# Patient Record
Sex: MALE | Race: WHITE | NOT HISPANIC OR LATINO | URBAN - METROPOLITAN AREA
[De-identification: names, ages, dates, MRNs, and addresses within clinical notes are randomized per-mention and may not be internally consistent; named-entity substitution may affect disease eponyms.]

---

## 2017-01-25 NOTE — PATIENT DISCUSSION
Posterior Vitreous Detachment (PVD) Counseling: I have discussed the diagnosis of PVD with the patient and the possibility of a retinal tear or detachment. The signs/symptoms of a retinal tear/detachment were reviewed to include but not limited to redness, discharge, pain, increase or change in flashes of light, increase or change in floaters, decreased vision, part of the vision missing, veils or any other ocular concerns. I have further explained not all patients who develop a tear or detachment have notable symptoms, therefore, compliance with return visits are necessary. The patient was instructed to contact us immediately if they noticed any of the signs or symptoms of retinal detachment as noted above as the prognosis for any potential treatment options may be time related. Return for follow-up as scheduled.

## 2017-10-31 ENCOUNTER — IMPORTED ENCOUNTER (OUTPATIENT)
Dept: URBAN - METROPOLITAN AREA CLINIC 1 | Facility: CLINIC | Age: 62
End: 2017-10-31

## 2017-10-31 PROBLEM — H52.13: Noted: 2017-10-31

## 2017-10-31 PROCEDURE — S0621 ROUTINE OPHTHALMOLOGICAL EXA: HCPCS

## 2017-10-31 NOTE — PATIENT DISCUSSION
1. Myopia OU- Rx for glasses given. 2. Cats OU- observe Return for an appointment in 1 year 36 with Dr. Kameron Howard.

## 2018-11-01 ENCOUNTER — IMPORTED ENCOUNTER (OUTPATIENT)
Dept: URBAN - METROPOLITAN AREA CLINIC 1 | Facility: CLINIC | Age: 63
End: 2018-11-01

## 2018-11-01 PROBLEM — H52.13: Noted: 2018-11-01

## 2018-11-01 PROCEDURE — S0621 ROUTINE OPHTHALMOLOGICAL EXA: HCPCS

## 2018-11-01 NOTE — PATIENT DISCUSSION
1. Myopia OU- Rx for glasses given. 2. Cats OU- observeReturn for an appointment in 1 yr 36 with Dr. Kameron Howard.

## 2019-11-08 ENCOUNTER — IMPORTED ENCOUNTER (OUTPATIENT)
Dept: URBAN - METROPOLITAN AREA CLINIC 1 | Facility: CLINIC | Age: 64
End: 2019-11-08

## 2019-11-08 PROBLEM — H52.13: Noted: 2019-11-08

## 2019-11-08 PROBLEM — H52.4: Noted: 2019-11-08

## 2019-11-08 PROCEDURE — S0621 ROUTINE OPHTHALMOLOGICAL EXA: HCPCS

## 2019-11-08 NOTE — PATIENT DISCUSSION
1. Myopia: Rx was given for correction if indicated and requested. 2. Presbyopia3. Cataract OU - Observe Return for an appointment in 1 year 36 with Dr. Candace Monk.

## 2020-11-13 ENCOUNTER — IMPORTED ENCOUNTER (OUTPATIENT)
Dept: URBAN - METROPOLITAN AREA CLINIC 1 | Facility: CLINIC | Age: 65
End: 2020-11-13

## 2020-11-13 ENCOUNTER — PREPPED CHART (OUTPATIENT)
Dept: URBAN - METROPOLITAN AREA CLINIC 1 | Facility: CLINIC | Age: 65
End: 2020-11-13

## 2020-11-13 PROBLEM — H52.13: Noted: 2020-11-13

## 2020-11-13 PROBLEM — H52.4: Noted: 2020-11-13

## 2020-11-13 PROCEDURE — S0621 ROUTINE OPHTHALMOLOGICAL EXA: HCPCS

## 2020-11-13 NOTE — PATIENT DISCUSSION
1. Myopia: Rx was given for correction if indicated and requested. 2. Presbyopia3. Cataract OU - ObserveReturn for an appointment in 1 year 36 with Dr. Cathleen Medina.

## 2022-02-25 ENCOUNTER — COMPREHENSIVE EXAM (OUTPATIENT)
Dept: URBAN - METROPOLITAN AREA CLINIC 1 | Facility: CLINIC | Age: 67
End: 2022-02-25

## 2022-02-25 DIAGNOSIS — H52.223: ICD-10-CM

## 2022-02-25 DIAGNOSIS — H52.4: ICD-10-CM

## 2022-02-25 DIAGNOSIS — H52.13: ICD-10-CM

## 2022-02-25 PROCEDURE — S0621 ROUTINE OPHTHALMOLOGICAL EXA: HCPCS

## 2022-02-25 ASSESSMENT — VISUAL ACUITY
OS_CC: 20/40
OD_CC: 20/20
OD_CC: 20/25
OS_CC: 20/30

## 2022-02-25 ASSESSMENT — TONOMETRY
OD_IOP_MMHG: 20
OS_IOP_MMHG: 20
OS_IOP_MMHG: 20
OD_IOP_MMHG: 18

## 2022-04-02 ASSESSMENT — VISUAL ACUITY
OD_CC: J1+
OS_CC: J1+
OS_SC: 20/40
OD_SC: 20/20
OS_SC: 20/20
OS_SC: 20/20
OS_SC: J1+
OS_SC: J1
OD_SC: J1
OD_SC: 20/20
OD_SC: 20/20
OS_SC: 20/20
OD_SC: J1+
OD_SC: 20/20

## 2022-04-02 ASSESSMENT — TONOMETRY
OD_IOP_MMHG: 18
OD_IOP_MMHG: 20
OD_IOP_MMHG: 17
OS_IOP_MMHG: 16
OS_IOP_MMHG: 20
OS_IOP_MMHG: 20
OD_IOP_MMHG: 20
OS_IOP_MMHG: 20
OD_IOP_MMHG: 16

## 2022-09-14 ENCOUNTER — COMPREHENSIVE EXAM (OUTPATIENT)
Dept: URBAN - METROPOLITAN AREA CLINIC 1 | Facility: CLINIC | Age: 67
End: 2022-09-14

## 2022-09-14 DIAGNOSIS — H25.813: ICD-10-CM

## 2022-09-14 DIAGNOSIS — H35.033: ICD-10-CM

## 2022-09-14 PROCEDURE — 99214 OFFICE O/P EST MOD 30 MIN: CPT

## 2022-09-14 PROCEDURE — 92015 DETERMINE REFRACTIVE STATE: CPT

## 2022-09-14 ASSESSMENT — TONOMETRY
OD_IOP_MMHG: 18
OS_IOP_MMHG: 21

## 2022-09-14 ASSESSMENT — VISUAL ACUITY
OD_BAT: 20/30
OD_CC: 20/20-2
OS_CC: 20/40+2
OS_BAT: 20/30

## 2023-05-16 NOTE — PATIENT DISCUSSION
COUNSELING:
DIABETES WITHOUT RETINOPATHY: RETURN FOR FOLLOW-UP AS SCHEDULED FOR DILATED EXAM.
Diabetes without Retinopathy Counseling :  I have discussed with the patient the importance of controlling blood glucose, blood pressure and lipid levels levels  to minimize the risk of developing retinal disease from diabetes. Explained the importance of annual dilated eye exams because effective treatment for diabetic retinopathy depends on timely intervention. The patient was instructed to call or return sooner if they noticed blurred or distorted vision, fluctuating vision, pain or redness around one or both eyes. Return for follow-up as scheduled.
Dry Eye Syndrome Counseling: I have discussed the diagnosis and the pathophysiology of this disease with the patient. Eyelid pathology and systemic illnesses such as Sjogren?s disease or rheumatoid arthritis may contribute to severity. Vision may be limited by dry eye, and symptoms exacerbated by environmental factors such as smoke, wind, or prolonged eye use. Treatment options include, but are not limited to, artificial tears, punctal plugs, topical cyclosporine, oral omega-3 supplements, Lipiflow, moisture goggles, and lubricating ointments. I stressed the importance of compliance with treatment.
GLAUCOMA SUSPECT TESTING: DISCUSSED FOR PATIENT TO RETURN FOR FURTHER TESTING SUCH AS. .. HVF 24-2, OCT RNFL, AND PACHYMETRY.
GLAUCOMA SUSPECT, OU : ENLARGED OPTIC NERVE CUPPING. RETURN FOR FOLLOW UP AS SCHEDULED.
General:
Glaucoma Suspect Counseling:  I have explained to the patient at length glaucoma potentially causes loss of peripheral vision due to damage to the optic nerve. I have explained that damage to the optic nerve from glaucoma is irreversible and that the available treatments for glaucoma are designed to prevent further damage if we determine glaucoma is present. I have explained the importance of regular follow-up with dilated eye exams to monitor for possible progression.
MODERATE DRY EYES: PRESCRIBED OTC DISAPPEARING PRESERVATIVE FREE TEARS OR PRESERVATIVE FREE ARTIFICIAL TEARS UP TO BID-QID, OU AND THE DAILY INTAKE OF OMEGA-3 DHA/EPA FATTY ACIDS TO HELP RELIEVE SYMPTOMS. ADD NIGHTLY LUBRICATING OINTMENT OR GEL. WILL CONSIDER RESTASIS OR XIIDRA OR PUNCTAL PLUGS AND/OR LIPIFLOW TREATMENT NEXT VISIT IF NOT RESPONSIVE OR IF SYMPTOMS PERSIST.  RETURN FOR FOLLOW-UP AS SCHEDULED OR SOONER IF SYMPTOMS WORSEN
POSTERIOR VITREOUS DETACHMENT, OU:  NO HOLES. NO TEARS. RETURN FOR FOLLOW-UP AS SCHEDULED.
Posterior Vitreous Detachment (PVD) Counseling: I have discussed the diagnosis of PVD with the patient and the possibility of a retinal tear or detachment. The signs/symptoms of a retinal tear/detachment were reviewed to include but not limited to redness, discharge, pain, increase or change in flashes of light, increase or change in floaters, decreased vision, part of the vision missing, veils or any other ocular concerns. I have further explained not all patients who develop a tear or detachment have notable symptoms, therefore, compliance with return visits are necessary. The patient was instructed to contact us immediately if they noticed any of the signs or symptoms of retinal detachment as noted above as the prognosis for any potential treatment options may be time related. Return for follow-up as scheduled.
92

## 2023-07-20 ENCOUNTER — COMPREHENSIVE EXAM (OUTPATIENT)
Dept: URBAN - METROPOLITAN AREA CLINIC 1 | Facility: CLINIC | Age: 68
End: 2023-07-20

## 2023-07-20 DIAGNOSIS — Z01.00: ICD-10-CM

## 2023-07-20 PROCEDURE — 92014 COMPRE OPH EXAM EST PT 1/>: CPT

## 2023-07-20 PROCEDURE — 92015 DETERMINE REFRACTIVE STATE: CPT

## 2023-07-20 ASSESSMENT — VISUAL ACUITY
OS_CC: 20/25
OS_CC: J2
OD_CC: J2
OD_CC: 20/25

## 2023-07-20 ASSESSMENT — TONOMETRY
OD_IOP_MMHG: 16
OS_IOP_MMHG: 18

## 2024-01-18 ENCOUNTER — COMPREHENSIVE EXAM (OUTPATIENT)
Dept: URBAN - METROPOLITAN AREA CLINIC 1 | Facility: CLINIC | Age: 69
End: 2024-01-18

## 2024-01-18 DIAGNOSIS — H25.813: ICD-10-CM

## 2024-01-18 DIAGNOSIS — H11.153: ICD-10-CM

## 2024-01-18 DIAGNOSIS — H35.033: ICD-10-CM

## 2024-01-18 PROCEDURE — 99214 OFFICE O/P EST MOD 30 MIN: CPT

## 2024-01-18 ASSESSMENT — VISUAL ACUITY
OD_SC: 20/40
OD_PH: 20/20-1
OD_SC: J1+
OS_PH: 20/30-1
OS_SC: 20/70
OS_SC: J1+

## 2024-01-18 ASSESSMENT — TONOMETRY
OD_IOP_MMHG: 18
OS_IOP_MMHG: 18

## 2024-07-25 ENCOUNTER — COMPREHENSIVE EXAM (OUTPATIENT)
Dept: URBAN - METROPOLITAN AREA CLINIC 1 | Facility: CLINIC | Age: 69
End: 2024-07-25

## 2024-07-25 DIAGNOSIS — H52.223: ICD-10-CM

## 2024-07-25 DIAGNOSIS — Z01.00: ICD-10-CM

## 2024-07-25 DIAGNOSIS — H52.13: ICD-10-CM

## 2024-07-25 DIAGNOSIS — H52.4: ICD-10-CM

## 2024-07-25 PROCEDURE — 92015 DETERMINE REFRACTIVE STATE: CPT

## 2024-07-25 PROCEDURE — 92014 COMPRE OPH EXAM EST PT 1/>: CPT

## 2024-07-25 ASSESSMENT — VISUAL ACUITY
OS_CC: 20/20-1
OD_CC: 20/20
OS_CC: J1+
OD_CC: J1+

## 2024-07-25 ASSESSMENT — TONOMETRY
OS_IOP_MMHG: 16
OD_IOP_MMHG: 16

## 2025-01-23 ENCOUNTER — COMPREHENSIVE EXAM (OUTPATIENT)
Age: 70
End: 2025-01-23

## 2025-01-23 DIAGNOSIS — H11.153: ICD-10-CM

## 2025-01-23 DIAGNOSIS — H25.813: ICD-10-CM

## 2025-01-23 DIAGNOSIS — H35.033: ICD-10-CM

## 2025-01-23 DIAGNOSIS — H02.834: ICD-10-CM

## 2025-01-23 DIAGNOSIS — H02.831: ICD-10-CM

## 2025-01-23 DIAGNOSIS — H16.143: ICD-10-CM

## 2025-01-23 DIAGNOSIS — H04.123: ICD-10-CM

## 2025-01-23 PROCEDURE — 99214 OFFICE O/P EST MOD 30 MIN: CPT
